# Patient Record
Sex: MALE | Race: ASIAN | URBAN - METROPOLITAN AREA
[De-identification: names, ages, dates, MRNs, and addresses within clinical notes are randomized per-mention and may not be internally consistent; named-entity substitution may affect disease eponyms.]

---

## 2022-04-11 ENCOUNTER — OFFICE VISIT (OUTPATIENT)
Dept: URBAN - METROPOLITAN AREA CLINIC 60 | Facility: CLINIC | Age: 82
End: 2022-04-11
Payer: COMMERCIAL

## 2022-04-11 DIAGNOSIS — H40.022 OPEN ANGLE WITH BORDERLINE FINDINGS, HIGH RISK, LEFT EYE: ICD-10-CM

## 2022-04-11 DIAGNOSIS — H40.1412 CAPSLR GLAUCOMA W/PSEUDOEF LENS, RIGHT EYE, MODERATE STAGE: ICD-10-CM

## 2022-04-11 DIAGNOSIS — R73.03 BORDERLINE DIABETES MELLITUS: ICD-10-CM

## 2022-04-11 PROCEDURE — 99204 OFFICE O/P NEW MOD 45 MIN: CPT | Performed by: OPHTHALMOLOGY

## 2022-04-11 PROCEDURE — 76514 ECHO EXAM OF EYE THICKNESS: CPT | Performed by: OPHTHALMOLOGY

## 2022-04-11 PROCEDURE — 92133 CPTRZD OPH DX IMG PST SGM ON: CPT | Performed by: OPHTHALMOLOGY

## 2022-04-11 ASSESSMENT — KERATOMETRY
OD: 42.67
OS: 42.86

## 2022-04-11 ASSESSMENT — INTRAOCULAR PRESSURE
OD: 32
OD: 39
OS: 21

## 2022-04-11 ASSESSMENT — VISUAL ACUITY
OD: 20/20
OS: 20/20

## 2022-04-11 NOTE — IMPRESSION/PLAN
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 Plan: High IOP with PXF OD, thin corneas and RNFL thinning. Start Lumigan qHS OU. If needs further decrease in IOP add drops vs SLT.

## 2022-04-11 NOTE — IMPRESSION/PLAN
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 Plan: Control glucose. No diabetic Retinopathy noted.

## 2022-04-15 ENCOUNTER — OFFICE VISIT (OUTPATIENT)
Dept: URBAN - METROPOLITAN AREA CLINIC 63 | Facility: CLINIC | Age: 82
End: 2022-04-15
Payer: COMMERCIAL

## 2022-04-15 DIAGNOSIS — H25.813 COMBINED FORMS OF AGE-RELATED CATARACT, BILATERAL: ICD-10-CM

## 2022-04-15 PROCEDURE — 92083 EXTENDED VISUAL FIELD XM: CPT | Performed by: OPHTHALMOLOGY

## 2022-04-15 PROCEDURE — 99213 OFFICE O/P EST LOW 20 MIN: CPT | Performed by: OPHTHALMOLOGY

## 2022-04-15 RX ORDER — BIMATOPROST 0.1 MG/ML
0.01 % SOLUTION/ DROPS OPHTHALMIC
Qty: 3 | Refills: 6 | Status: ACTIVE
Start: 2022-04-15

## 2022-04-15 NOTE — IMPRESSION/PLAN
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 Plan: IOP improved. Cont. Mariselaigan qHS OU. If IOP > 18 OD consider SLT vs additional drops.

## 2022-06-15 ENCOUNTER — OFFICE VISIT (OUTPATIENT)
Dept: URBAN - METROPOLITAN AREA CLINIC 63 | Facility: CLINIC | Age: 82
End: 2022-06-15
Payer: COMMERCIAL

## 2022-06-15 DIAGNOSIS — H40.022 OPEN ANGLE WITH BORDERLINE FINDINGS, HIGH RISK, LEFT EYE: Primary | ICD-10-CM

## 2022-06-15 DIAGNOSIS — H40.1412 CAPSLR GLAUCOMA W/PSEUDOEF LENS, RIGHT EYE, MODERATE STAGE: ICD-10-CM

## 2022-06-15 PROCEDURE — 99203 OFFICE O/P NEW LOW 30 MIN: CPT | Performed by: OPTOMETRIST

## 2022-06-15 ASSESSMENT — INTRAOCULAR PRESSURE
OS: 14
OD: 18
OD: 14

## 2022-06-15 NOTE — IMPRESSION/PLAN
Impression: Open angle with borderline findings, high risk, left eye: H40.022. Plan: IOP stable OU. Continue Latanoprost QHS OU, check IOP in 6 months.

## 2023-01-03 ENCOUNTER — OFFICE VISIT (OUTPATIENT)
Dept: URBAN - METROPOLITAN AREA CLINIC 63 | Facility: CLINIC | Age: 83
End: 2023-01-03
Payer: COMMERCIAL

## 2023-01-03 DIAGNOSIS — H40.022 OPEN ANGLE WITH BORDERLINE FINDINGS, HIGH RISK, LEFT EYE: Primary | ICD-10-CM

## 2023-01-03 DIAGNOSIS — H40.1412 CAPSLR GLAUCOMA W/PSEUDOEF LENS, RIGHT EYE, MODERATE STAGE: ICD-10-CM

## 2023-01-03 DIAGNOSIS — H25.813 COMBINED FORMS OF AGE-RELATED CATARACT, BILATERAL: ICD-10-CM

## 2023-01-03 PROCEDURE — 99213 OFFICE O/P EST LOW 20 MIN: CPT | Performed by: OPTOMETRIST

## 2023-01-03 RX ORDER — LATANOPROST 50 UG/ML
0.005 % SOLUTION OPHTHALMIC
Qty: 3 | Refills: 6 | Status: ACTIVE
Start: 2023-01-03

## 2023-01-03 ASSESSMENT — INTRAOCULAR PRESSURE
OD: 30
OS: 28

## 2023-01-03 NOTE — IMPRESSION/PLAN
Impression: Open angle with borderline findings, high risk, left eye: H40.022. Plan: POAG with unstable findings.   Restart Latanoprost and recheck pressure in 6 months  for complete exam.

## 2023-07-05 ENCOUNTER — OFFICE VISIT (OUTPATIENT)
Dept: URBAN - METROPOLITAN AREA CLINIC 63 | Facility: CLINIC | Age: 83
End: 2023-07-05
Payer: MEDICARE

## 2023-07-05 DIAGNOSIS — H25.813 COMBINED FORMS OF AGE-RELATED CATARACT, BILATERAL: ICD-10-CM

## 2023-07-05 DIAGNOSIS — H40.1412 CAPSLR GLAUCOMA W/PSEUDOEF LENS, RIGHT EYE, MODERATE STAGE: ICD-10-CM

## 2023-07-05 DIAGNOSIS — H40.022 OPEN ANGLE WITH BORDERLINE FINDINGS, HIGH RISK, LEFT EYE: ICD-10-CM

## 2023-07-05 DIAGNOSIS — H04.123 DRY EYE SYNDROME OF BILATERAL LACRIMAL GLANDS: ICD-10-CM

## 2023-07-05 DIAGNOSIS — E11.9 TYPE 2 DIABETES MELLITUS W/O COMPLICATION: Primary | ICD-10-CM

## 2023-07-05 PROCEDURE — 92133 CPTRZD OPH DX IMG PST SGM ON: CPT | Performed by: OPTOMETRIST

## 2023-07-05 PROCEDURE — 92014 COMPRE OPH EXAM EST PT 1/>: CPT | Performed by: OPTOMETRIST

## 2023-07-05 ASSESSMENT — VISUAL ACUITY
OS: 20/25
OD: 20/30

## 2023-07-05 ASSESSMENT — INTRAOCULAR PRESSURE
OD: 16
OS: 15

## 2023-07-05 ASSESSMENT — KERATOMETRY
OD: 43.00
OS: 42.75

## 2023-07-05 NOTE — IMPRESSION/PLAN
Impression: Open angle with borderline findings, high risk, left eye: H40.022. Plan: IOP improved on Latanoprost. Cont. Latanoprost OU QHS. OCT ordered and reviewed by Dr. Britney Tay.

## 2023-07-05 NOTE — IMPRESSION/PLAN
Impression: Type 2 diabetes mellitus w/o complication: S99.4. Plan: Diabetes type II: no background retinopathy, no signs of neovascularization noted. Discussed ocular and systemic benefits of blood sugar control.